# Patient Record
Sex: FEMALE | URBAN - METROPOLITAN AREA
[De-identification: names, ages, dates, MRNs, and addresses within clinical notes are randomized per-mention and may not be internally consistent; named-entity substitution may affect disease eponyms.]

---

## 2022-07-05 ENCOUNTER — OFFICE (OUTPATIENT)
Dept: URBAN - METROPOLITAN AREA CLINIC 86 | Facility: CLINIC | Age: 72
Setting detail: OPHTHALMOLOGY
End: 2022-07-05
Payer: MEDICARE

## 2022-07-05 DIAGNOSIS — H25.13: ICD-10-CM

## 2022-07-05 DIAGNOSIS — H52.4: ICD-10-CM

## 2022-07-05 DIAGNOSIS — H40.033: ICD-10-CM

## 2022-07-05 PROCEDURE — 92015 DETERMINE REFRACTIVE STATE: CPT | Performed by: OPHTHALMOLOGY

## 2022-07-05 PROCEDURE — 92020 GONIOSCOPY: CPT | Performed by: OPHTHALMOLOGY

## 2022-07-05 PROCEDURE — 92014 COMPRE OPH EXAM EST PT 1/>: CPT | Performed by: OPHTHALMOLOGY

## 2022-07-05 ASSESSMENT — REFRACTION_CURRENTRX
OS_OVR_VA: 20/
OS_ADD: +2.50
OS_SPHERE: +1.25
OD_AXIS: 015
OS_CYLINDER: +0.50
OD_OVR_VA: 20/
OD_CYLINDER: +0.50
OD_SPHERE: +1.25
OD_ADD: +2.50
OS_AXIS: 165

## 2022-07-05 ASSESSMENT — REFRACTION_MANIFEST
OS_ADD: +2.50
OD_SPHERE: +1.25
OS_VA1: 20/20
OS_SPHERE: +1.25
OD_CYLINDER: +1.00
OD_ADD: +2.50
OD_VA2: 20/20
OS_CYLINDER: +1.00
OS_AXIS: 165
OD_VA1: 20/20
OS_VA2: 20/20
OD_AXIS: 015

## 2022-07-05 ASSESSMENT — VISUAL ACUITY
OS_BCVA: 20/25+
OD_BCVA: 20/25-

## 2022-07-05 ASSESSMENT — TONOMETRY
OS_IOP_MMHG: 21
OD_IOP_MMHG: 17

## 2022-07-05 ASSESSMENT — CONFRONTATIONAL VISUAL FIELD TEST (CVF)
OS_FINDINGS: FULL
OD_FINDINGS: FULL

## 2022-07-05 ASSESSMENT — SPHEQUIV_DERIVED
OS_SPHEQUIV: 1.75
OD_SPHEQUIV: 1.75

## 2023-06-19 ENCOUNTER — OFFICE (OUTPATIENT)
Dept: URBAN - METROPOLITAN AREA CLINIC 86 | Facility: CLINIC | Age: 73
Setting detail: OPHTHALMOLOGY
End: 2023-06-19
Payer: MEDICARE

## 2023-06-19 DIAGNOSIS — H52.4: ICD-10-CM

## 2023-06-19 DIAGNOSIS — H40.033: ICD-10-CM

## 2023-06-19 DIAGNOSIS — H25.13: ICD-10-CM

## 2023-06-19 PROCEDURE — 92015 DETERMINE REFRACTIVE STATE: CPT | Performed by: OPHTHALMOLOGY

## 2023-06-19 PROCEDURE — 92020 GONIOSCOPY: CPT | Performed by: OPHTHALMOLOGY

## 2023-06-19 PROCEDURE — 92014 COMPRE OPH EXAM EST PT 1/>: CPT | Performed by: OPHTHALMOLOGY

## 2023-06-19 ASSESSMENT — REFRACTION_MANIFEST
OS_AXIS: 165
OD_AXIS: 15
OD_VA1: 20/20
OS_VA2: 20/20
OS_SPHERE: +1.00
OS_ADD: +2.50
OS_VA1: 20/20
OD_ADD: +2.50
OD_CYLINDER: +0.50
OD_VA2: 20/20
OD_SPHERE: +1.75
OS_CYLINDER: +1.00

## 2023-06-19 ASSESSMENT — REFRACTION_CURRENTRX
OD_CYLINDER: +0.50
OD_SPHERE: +1.25
OS_ADD: +2.50
OD_OVR_VA: 20/
OS_SPHERE: +1.25
OS_OVR_VA: 20/
OS_AXIS: 165
OD_ADD: +2.50
OD_AXIS: 015
OS_CYLINDER: +0.50

## 2023-06-19 ASSESSMENT — VISUAL ACUITY
OS_BCVA: 20/30-
OD_BCVA: 20/40

## 2023-06-19 ASSESSMENT — CONFRONTATIONAL VISUAL FIELD TEST (CVF)
OD_FINDINGS: FULL
OS_FINDINGS: FULL

## 2023-06-19 ASSESSMENT — TONOMETRY
OD_IOP_MMHG: 20
OS_IOP_MMHG: 18

## 2023-06-19 ASSESSMENT — SPHEQUIV_DERIVED
OS_SPHEQUIV: 1.5
OD_SPHEQUIV: 2

## 2023-11-29 ENCOUNTER — HOSPITAL ENCOUNTER (EMERGENCY)
Facility: HOSPITAL | Age: 73
Discharge: HOME | End: 2023-11-30
Attending: EMERGENCY MEDICINE
Payer: COMMERCIAL

## 2023-11-29 ENCOUNTER — APPOINTMENT (EMERGENCY)
Dept: RADIOLOGY | Facility: HOSPITAL | Age: 73
End: 2023-11-29
Payer: COMMERCIAL

## 2023-11-29 DIAGNOSIS — I10 HYPERTENSION, UNSPECIFIED TYPE: ICD-10-CM

## 2023-11-29 DIAGNOSIS — R05.1 ACUTE COUGH: Primary | ICD-10-CM

## 2023-11-29 LAB
ALBUMIN SERPL-MCNC: 3.5 G/DL (ref 3.5–5.7)
ALP SERPL-CCNC: 72 IU/L (ref 34–125)
ALT SERPL-CCNC: 17 IU/L (ref 7–52)
ANION GAP SERPL CALC-SCNC: 6 MEQ/L (ref 3–15)
AST SERPL-CCNC: 24 IU/L (ref 13–39)
BASOPHILS # BLD: 0.02 K/UL (ref 0.01–0.1)
BASOPHILS NFR BLD: 0.3 %
BILIRUB SERPL-MCNC: 0.8 MG/DL (ref 0.3–1.2)
BUN SERPL-MCNC: 21 MG/DL (ref 7–25)
CALCIUM SERPL-MCNC: 9 MG/DL (ref 8.6–10.3)
CHLORIDE SERPL-SCNC: 105 MEQ/L (ref 98–107)
CO2 SERPL-SCNC: 30 MEQ/L (ref 21–31)
CREAT SERPL-MCNC: 1.2 MG/DL (ref 0.6–1.2)
DIFFERENTIAL METHOD BLD: ABNORMAL
EOSINOPHIL # BLD: 0.53 K/UL (ref 0.04–0.36)
EOSINOPHIL NFR BLD: 8.8 %
ERYTHROCYTE [DISTWIDTH] IN BLOOD BY AUTOMATED COUNT: 14.8 % (ref 11.7–14.4)
FLUAV RNA SPEC QL NAA+PROBE: NEGATIVE
FLUBV RNA SPEC QL NAA+PROBE: NEGATIVE
GFR SERPL CREATININE-BSD FRML MDRD: 53.4 ML/MIN/1.73M*2
GLUCOSE SERPL-MCNC: 106 MG/DL (ref 70–99)
HCT VFR BLDCO AUTO: 38.8 % (ref 35–45)
HGB BLD-MCNC: 12.2 G/DL (ref 11.8–15.7)
IMM GRANULOCYTES # BLD AUTO: 0.01 K/UL (ref 0–0.08)
IMM GRANULOCYTES NFR BLD AUTO: 0.2 %
LYMPHOCYTES # BLD: 1.55 K/UL (ref 1.2–3.5)
LYMPHOCYTES NFR BLD: 25.7 %
MCH RBC QN AUTO: 28.1 PG (ref 28–33.2)
MCHC RBC AUTO-ENTMCNC: 31.4 G/DL (ref 32.2–35.5)
MCV RBC AUTO: 89.4 FL (ref 83–98)
MONOCYTES # BLD: 0.55 K/UL (ref 0.28–0.8)
MONOCYTES NFR BLD: 9.1 %
NEUTROPHILS # BLD: 3.38 K/UL (ref 1.7–7)
NEUTS SEG NFR BLD: 55.9 %
NRBC BLD-RTO: 0 %
PDW BLD AUTO: 12.8 FL (ref 9.4–12.3)
PLATELET # BLD AUTO: 130 K/UL (ref 150–369)
POTASSIUM SERPL-SCNC: 3.5 MEQ/L (ref 3.5–5.1)
PROT SERPL-MCNC: 7.4 G/DL (ref 6–8.2)
RBC # BLD AUTO: 4.34 M/UL (ref 3.93–5.22)
RSV RNA SPEC QL NAA+PROBE: NEGATIVE
SARS-COV-2 RNA RESP QL NAA+PROBE: NEGATIVE
SODIUM SERPL-SCNC: 141 MEQ/L (ref 136–145)
TROPONIN I SERPL HS-MCNC: 10.2 PG/ML
WBC # BLD AUTO: 6.04 K/UL (ref 3.8–10.5)

## 2023-11-29 PROCEDURE — 3E033GC INTRODUCTION OF OTHER THERAPEUTIC SUBSTANCE INTO PERIPHERAL VEIN, PERCUTANEOUS APPROACH: ICD-10-PCS | Performed by: EMERGENCY MEDICINE

## 2023-11-29 PROCEDURE — 80053 COMPREHEN METABOLIC PANEL: CPT | Performed by: EMERGENCY MEDICINE

## 2023-11-29 PROCEDURE — 71045 X-RAY EXAM CHEST 1 VIEW: CPT

## 2023-11-29 PROCEDURE — 85025 COMPLETE CBC W/AUTO DIFF WBC: CPT

## 2023-11-29 PROCEDURE — 87637 SARSCOV2&INF A&B&RSV AMP PRB: CPT | Performed by: EMERGENCY MEDICINE

## 2023-11-29 PROCEDURE — 83880 ASSAY OF NATRIURETIC PEPTIDE: CPT | Performed by: EMERGENCY MEDICINE

## 2023-11-29 PROCEDURE — 99283 EMERGENCY DEPT VISIT LOW MDM: CPT | Mod: 25

## 2023-11-29 PROCEDURE — 93005 ELECTROCARDIOGRAM TRACING: CPT

## 2023-11-29 PROCEDURE — 87637 SARSCOV2&INF A&B&RSV AMP PRB: CPT

## 2023-11-29 PROCEDURE — 84484 ASSAY OF TROPONIN QUANT: CPT | Mod: 91 | Performed by: EMERGENCY MEDICINE

## 2023-11-29 PROCEDURE — 36415 COLL VENOUS BLD VENIPUNCTURE: CPT

## 2023-11-29 PROCEDURE — 84484 ASSAY OF TROPONIN QUANT: CPT

## 2023-11-29 PROCEDURE — 84484 ASSAY OF TROPONIN QUANT: CPT | Performed by: EMERGENCY MEDICINE

## 2023-11-29 PROCEDURE — 80053 COMPREHEN METABOLIC PANEL: CPT

## 2023-11-29 PROCEDURE — 85025 COMPLETE CBC W/AUTO DIFF WBC: CPT | Performed by: EMERGENCY MEDICINE

## 2023-11-29 PROCEDURE — 96374 THER/PROPH/DIAG INJ IV PUSH: CPT

## 2023-11-29 PROCEDURE — 63700000 HC SELF-ADMINISTRABLE DRUG: Performed by: EMERGENCY MEDICINE

## 2023-11-29 PROCEDURE — 63600000 HC DRUGS/DETAIL CODE: Mod: JZ | Performed by: EMERGENCY MEDICINE

## 2023-11-29 PROCEDURE — 93005 ELECTROCARDIOGRAM TRACING: CPT | Performed by: EMERGENCY MEDICINE

## 2023-11-29 RX ORDER — APIXABAN 5 MG/1
TABLET, FILM COATED ORAL
COMMUNITY
Start: 2023-10-06

## 2023-11-29 RX ORDER — ASPIRIN 81 MG/1
81 TABLET ORAL DAILY
COMMUNITY

## 2023-11-29 RX ORDER — HYDROCHLOROTHIAZIDE 25 MG/1
TABLET ORAL
COMMUNITY
Start: 2023-11-20

## 2023-11-29 RX ORDER — DONEPEZIL HYDROCHLORIDE 5 MG/1
TABLET, FILM COATED ORAL
COMMUNITY
Start: 2023-10-06

## 2023-11-29 RX ORDER — LISINOPRIL 40 MG/1
TABLET ORAL
COMMUNITY
Start: 2023-10-06

## 2023-11-29 RX ORDER — SULFAMETHOXAZOLE AND TRIMETHOPRIM 800; 160 MG/1; MG/1
TABLET ORAL
COMMUNITY
Start: 2023-11-10

## 2023-11-29 RX ORDER — METOPROLOL SUCCINATE 50 MG/1
50 TABLET, EXTENDED RELEASE ORAL DAILY
COMMUNITY
Start: 2023-11-20

## 2023-11-29 RX ORDER — METOPROLOL SUCCINATE 100 MG/1
100 TABLET, EXTENDED RELEASE ORAL SEE ADMIN INSTRUCTIONS
COMMUNITY
Start: 2023-11-20

## 2023-11-29 RX ORDER — FUROSEMIDE 10 MG/ML
20 INJECTION INTRAMUSCULAR; INTRAVENOUS ONCE
Status: COMPLETED | OUTPATIENT
Start: 2023-11-29 | End: 2023-11-29

## 2023-11-29 RX ORDER — CLONIDINE HYDROCHLORIDE 0.1 MG/1
0.2 TABLET ORAL ONCE
Status: COMPLETED | OUTPATIENT
Start: 2023-11-29 | End: 2023-11-29

## 2023-11-29 RX ORDER — AMLODIPINE BESYLATE 5 MG/1
5 TABLET ORAL ONCE
Status: COMPLETED | OUTPATIENT
Start: 2023-11-29 | End: 2023-11-29

## 2023-11-29 RX ORDER — ERGOCALCIFEROL 1.25 MG/1
CAPSULE ORAL
COMMUNITY
Start: 2023-11-10

## 2023-11-29 RX ADMIN — CLONIDINE HYDROCHLORIDE 0.2 MG: 0.1 TABLET ORAL at 23:37

## 2023-11-29 RX ADMIN — APIXABAN 5 MG: 2.5 TABLET, FILM COATED ORAL at 22:13

## 2023-11-29 RX ADMIN — AMLODIPINE BESYLATE 5 MG: 5 TABLET ORAL at 22:15

## 2023-11-29 RX ADMIN — FUROSEMIDE 20 MG: 10 INJECTION, SOLUTION INTRAMUSCULAR; INTRAVENOUS at 22:12

## 2023-11-29 ASSESSMENT — ENCOUNTER SYMPTOMS
SINUS CONGESTION: 0
RHINORRHEA: 0
MYALGIAS: 0
CHEST TIGHTNESS: 1
VOMITING: 0
PALPITATIONS: 0
WEIGHT LOSS: 0
LIGHT-HEADEDNESS: 0
DIAPHORESIS: 0
ABDOMINAL PAIN: 0
FLANK PAIN: 0
SHORTNESS OF BREATH: 1
WHEEZING: 1
COUGH: 1
FATIGUE: 0
FEVER: 0
DIZZINESS: 0
NAUSEA: 0
STRIDOR: 0
HEADACHES: 0
BACK PAIN: 0
CHILLS: 0

## 2023-11-30 VITALS
TEMPERATURE: 98 F | BODY MASS INDEX: 40.85 KG/M2 | HEIGHT: 62 IN | SYSTOLIC BLOOD PRESSURE: 156 MMHG | RESPIRATION RATE: 22 BRPM | HEART RATE: 71 BPM | WEIGHT: 222 LBS | OXYGEN SATURATION: 100 % | DIASTOLIC BLOOD PRESSURE: 95 MMHG

## 2023-11-30 LAB
ATRIAL RATE: 312
BNP SERPL-MCNC: 621 PG/ML
QRS DURATION: 92
QT INTERVAL: 392
QTC CALCULATION(BAZETT): 476
R AXIS: 37
T WAVE AXIS: 61
TROPONIN I SERPL HS-MCNC: 3.5 PG/ML
VENTRICULAR RATE: 89

## 2023-11-30 RX ORDER — PROMETHAZINE HYDROCHLORIDE AND DEXTROMETHORPHAN HYDROBROMIDE 6.25; 15 MG/5ML; MG/5ML
5 SYRUP ORAL 4 TIMES DAILY PRN
Qty: 200 ML | Refills: 0 | Status: SHIPPED | OUTPATIENT
Start: 2023-11-30 | End: 2023-12-10

## 2023-11-30 RX ORDER — AMLODIPINE BESYLATE 5 MG/1
5 TABLET ORAL DAILY
Qty: 30 TABLET | Refills: 0 | Status: SHIPPED | OUTPATIENT
Start: 2023-11-30 | End: 2023-12-30

## 2023-11-30 NOTE — ED PROVIDER NOTES
Emergency Medicine Note  HPI   HISTORY OF PRESENT ILLNESS       History provided by:  Patient and relative  Cough  Cough characteristics: clear sputum.  Severity:  Moderate  Duration:  3 days  Timing:  Intermittent  Progression:  Unchanged  Chronicity:  New  Context comment:  With exertional dyspnea.  No fever.  No chest pain.  No nasal congestion.  Throat soreness after coughing.  Relieved by:  None tried  Worsened by:  Nothing  Associated symptoms: shortness of breath and wheezing    Associated symptoms: no chest pain, no chills, no diaphoresis, no fever, no headaches, no myalgias, no rash, no rhinorrhea, no sinus congestion and no weight loss    Shortness of Breath  Associated symptoms: cough and wheezing    Associated symptoms: no abdominal pain, no chest pain, no diaphoresis, no fever, no headaches, no rash and no vomiting          Patient History   PAST HISTORY     Reviewed from Nursing Triage:       Past Medical History:   Diagnosis Date   • A-fib (CMS/Formerly [facility])    • Hypertension    • Stroke (CMS/[facility]ly [facility])        No past surgical history on file.    No family history on file.           Review of Systems   REVIEW OF SYSTEMS     Review of Systems   Constitutional: Negative for chills, diaphoresis, fatigue, fever and weight loss.   HENT: Negative for congestion and rhinorrhea.    Respiratory: Positive for cough, chest tightness, shortness of breath and wheezing. Negative for stridor.    Cardiovascular: Negative for chest pain, palpitations and leg swelling.   Gastrointestinal: Negative for abdominal pain, nausea and vomiting.   Genitourinary: Negative for flank pain.   Musculoskeletal: Negative for back pain and myalgias.   Skin: Negative for rash.   Neurological: Negative for dizziness, light-headedness and headaches.         VITALS     ED Vitals    Date/Time Temp Pulse Resp BP SpO2 [facility]   11/29/23 2337 -- 94 -- 179/127 -- [facility]   11/29/23 2329 -- -- 22 179/127 95 % [facility]   11/29/23 2201 -- 94 21 -- 96 % [facility]   11/29/23 2201  -- -- -- 182/112 -- [facility]   11/29/23 2042 36.7 °C (98.1 °F) 72 20 203/117 95 % [facility]        Pulse Ox %: 95 % (11/29/23 2200)  Pulse Ox Interpretation: Normal (11/29/23 2200)  Heart Rate: 72 (11/29/23 2200)  Rhythm Strip Interpretation: Normal Sinus Rhythm (11/29/23 2200)     Physical Exam   PHYSICAL EXAM     Physical Exam  Vitals and nursing note reviewed.   Constitutional:       Appearance: She is well-developed.   HENT:      Head: Normocephalic.      Mouth/Throat:      Mouth: Mucous membranes are moist.      Pharynx: Oropharynx is clear.   Eyes:      Pupils: Pupils are equal, round, and reactive to light.   Cardiovascular:      Rate and Rhythm: Tachycardia present. Rhythm irregular.      Pulses: Normal pulses.      Heart sounds: Normal heart sounds. No murmur heard.  Pulmonary:      Effort: Pulmonary effort is normal.      Breath sounds: Wheezing (Slight end end expiratory) present. No rhonchi or rales.   Abdominal:      General: Bowel sounds are normal.      Palpations: Abdomen is soft. There is no mass.      Tenderness: There is no abdominal tenderness.   Musculoskeletal:      Cervical back: Neck supple.      Right lower leg: No tenderness. No edema.      Left lower leg: No tenderness. No edema.   Skin:     General: Skin is warm and dry.      Capillary Refill: Capillary refill takes less than 2 seconds.   Neurological:      Mental Status: She is alert.           PROCEDURES     Procedures     DATA     Results     Procedure Component Value Units Date/Time    SARS-CoV-2 (COVID-19) Nasopharynx [[id number]]  (Normal) Collected: 11/29/23 2101    Specimen: Nasopharyngeal Swab from Nasopharynx Updated: 11/29/23 2143    Narrative:      The following orders were created for panel order SARS-CoV-2 (COVID-19) Nasopharynx.  Procedure                               Abnormality         Status                     ---------                               -----------         ------                     SARS-COV-2 (COVID-19)/  F...[[id number]]  Normal              Final result                 Please view results for these tests on the individual orders.    SARS-COV-2 (COVID-19)/ FLU A/B, AND RSV, PCR Nasopharynx [[id number]]  (Normal) Collected: 11/29/23 2101    Specimen: Nasopharyngeal Swab from Nasopharynx Updated: 11/29/23 2143     SARS-CoV-2 (COVID-19) Negative     Influenza A Negative     Influenza B Negative     Respiratory Syncytial Virus Negative    Narrative:      Testing performed using real-time PCR for detection of COVID-19. EUA approved validation studies performed on site.     HS Troponin (with 2 hour reflex) [[id number]]  (Normal) Collected: 11/29/23 2055    Specimen: Blood, Venous Updated: 11/29/23 2139     High Sens Troponin I 10.2 pg/mL     Comprehensive metabolic panel [[id number]]  (Abnormal) Collected: 11/29/23 2055    Specimen: Blood, Venous Updated: 11/29/23 2127     Sodium 141 mEQ/L      Potassium 3.5 mEQ/L      Comment: Results obtained on plasma. Plasma Potassium values may be up to 0.4 mEQ/L less than serum values. The differences may be greater for patients with high platelet or white cell counts.        Chloride 105 mEQ/L      CO2 30 mEQ/L      BUN 21 mg/dL      Creatinine 1.2 mg/dL      Glucose 106 mg/dL      Calcium 9.0 mg/dL      AST (SGOT) 24 IU/L      ALT (SGPT) 17 IU/L      Alkaline Phosphatase 72 IU/L      Total Protein 7.4 g/dL      Comment: Test performed on plasma which typically contains approximately 0.4 g/dL more protein than serum.        Albumin 3.5 g/dL      Bilirubin, Total 0.8 mg/dL      eGFR 53.4 mL/min/1.73m*2      Anion Gap 6 mEQ/L     CBC and differential [[id number]]  (Abnormal) Collected: 11/29/23 2055    Specimen: Blood, Venous Updated: 11/29/23 2109     WBC 6.04 K/uL      RBC 4.34 M/uL      Hemoglobin 12.2 g/dL      Hematocrit 38.8 %      MCV 89.4 fL      MCH 28.1 pg      MCHC 31.4 g/dL      RDW 14.8 %      Platelets 130 K/uL      MPV 12.8 fL      Differential Type Auto     nRBC 0.0 %       Immature Granulocytes 0.2 %      Neutrophils 55.9 %      Lymphocytes 25.7 %      Monocytes 9.1 %      Eosinophils 8.8 %      Basophils 0.3 %      Immature Granulocytes, Absolute 0.01 K/uL      Neutrophils, Absolute 3.38 K/uL      Lymphocytes, Absolute 1.55 K/uL      Monocytes, Absolute 0.55 K/uL      Eosinophils, Absolute 0.53 K/uL      Basophils, Absolute 0.02 K/uL           Imaging Results          X-RAY CHEST 1 VIEW (Preliminary result)  Result time 11/29/23 22:14:12    Preliminary Interpretation    Cardiomegaly.  No infiltrate                              ECG 12 lead   Independent Interpretation by ED Provider   A-fib 89  No ischemic changes          Scoring tools                                  ED Course & MDM   MDM / ED COURSE / CLINICAL IMPRESSION / DISPO     MDM    ED Course as of 11/30/23 0108   Wed Nov 29, 2023 2214 Patient ran out of her Eliquis 2 days ago.  States she has been taking the rest of her medications.  Symptoms began after Thanksgiving dinner.  IV Lasix given.  Blood pressure control.  Eliquis. [JK]   7430 11:51 PM.  Change of shift.  Discussed case with Dr. Martin, and provided instructions to follow-up on pending test results, re-evaluate the patient, and make the appropriate disposition accordingly.  They will assume care of the patient at this time.   [JK]   8861 Change of shift - care transitioned from Dr. Mark. Pt here for shortness of breath. Pending labs and studies at time of sign out.   [RP]   u Nov 30, 2023   0106 BP improved. Delta troponin -7. Pt able to ambulate without difficulty or assistance. Plan for dc to home. Amlodipine and cough meds sent to pharmacy.   [RP]      ED Course User Index  [JK] Mg Mark MD  [RP] Lara Martin, DO     Clinical Impression      Acute cough   Hypertension, unspecified type               Mg Mark MD  11/29/23 0252